# Patient Record
Sex: MALE | Race: WHITE | NOT HISPANIC OR LATINO | Employment: FULL TIME | URBAN - METROPOLITAN AREA
[De-identification: names, ages, dates, MRNs, and addresses within clinical notes are randomized per-mention and may not be internally consistent; named-entity substitution may affect disease eponyms.]

---

## 2020-08-15 ENCOUNTER — HOSPITAL ENCOUNTER (EMERGENCY)
Facility: HOSPITAL | Age: 25
Discharge: HOME | End: 2020-08-15
Attending: EMERGENCY MEDICINE

## 2020-08-15 VITALS
OXYGEN SATURATION: 98 % | DIASTOLIC BLOOD PRESSURE: 69 MMHG | BODY MASS INDEX: 34.24 KG/M2 | HEIGHT: 77 IN | HEART RATE: 82 BPM | RESPIRATION RATE: 16 BRPM | SYSTOLIC BLOOD PRESSURE: 104 MMHG | WEIGHT: 290 LBS | TEMPERATURE: 98.8 F

## 2020-08-15 DIAGNOSIS — L02.91 CUTANEOUS ABSCESS: Primary | ICD-10-CM

## 2020-08-15 PROCEDURE — 99284 EMERGENCY DEPT VISIT MOD MDM: CPT | Performed by: EMERGENCY MEDICINE

## 2020-08-15 PROCEDURE — 99282 EMERGENCY DEPT VISIT SF MDM: CPT

## 2020-08-15 RX ORDER — CLINDAMYCIN HYDROCHLORIDE 150 MG/1
300 CAPSULE ORAL ONCE
Status: COMPLETED | OUTPATIENT
Start: 2020-08-15 | End: 2020-08-15

## 2020-08-15 RX ORDER — IBUPROFEN 400 MG/1
400 TABLET ORAL ONCE
Status: COMPLETED | OUTPATIENT
Start: 2020-08-15 | End: 2020-08-15

## 2020-08-15 RX ORDER — ACETAMINOPHEN 325 MG/1
650 TABLET ORAL ONCE
Status: COMPLETED | OUTPATIENT
Start: 2020-08-15 | End: 2020-08-15

## 2020-08-15 RX ORDER — CLINDAMYCIN HYDROCHLORIDE 150 MG/1
300 CAPSULE ORAL EVERY 8 HOURS SCHEDULED
Qty: 20 CAPSULE | Refills: 0 | Status: SHIPPED | OUTPATIENT
Start: 2020-08-15 | End: 2020-08-22

## 2020-08-15 RX ORDER — BUPIVACAINE HYDROCHLORIDE 5 MG/ML
30 INJECTION, SOLUTION EPIDURAL; INTRACAUDAL ONCE
Status: COMPLETED | OUTPATIENT
Start: 2020-08-15 | End: 2020-08-15

## 2020-08-15 RX ADMIN — BUPIVACAINE HYDROCHLORIDE 30 ML: 5 INJECTION, SOLUTION EPIDURAL; INTRACAUDAL at 13:50

## 2020-08-15 RX ADMIN — IBUPROFEN 400 MG: 400 TABLET ORAL at 13:49

## 2020-08-15 RX ADMIN — CLINDAMYCIN HYDROCHLORIDE 300 MG: 150 CAPSULE ORAL at 13:48

## 2020-08-15 RX ADMIN — ACETAMINOPHEN 650 MG: 325 TABLET, FILM COATED ORAL at 13:49

## 2020-08-15 NOTE — DISCHARGE INSTRUCTIONS
Diagnosis; right lower leg cutaneous abscess    - activity as tolerated     - can leave dressing on tonight- tomorrow can take off and wash daily with soap and water     - expect blood drainage over the next 7-14 days- should decrease over time - re dress as needed    - for 30 minutes a day for the next week- apply warm soap rag to area to let drain    - wound will take up to 1 month to fully heal     - inspect wound daily please return to  the er for any fevers- temp > 100 4/ any spreading redness/warmth/ pain at site- any redness spreading up leg     - cleocin- antibiotic- starting tonight with dinner 1 capsule 3 times a day for 7 days     - for pain- would take over the counter generic tylenol 500 mg/ together with over the counter generic ibuprofen 400 mg- 4 times per day with meals / liquids

## 2020-08-19 NOTE — ED PROVIDER NOTES
History  Chief Complaint   Patient presents with    Spider Bite     Pt reports suspected spider bite to right lower leg on 12th  Reports increased redness, pain with standing  30 yr male with previous hx of abscesses- c/o  3 days of rle swelling/ redness/ skin lesion - no fevers/chills/systemic comps       History provided by:  Patient   used: No        None       History reviewed  No pertinent past medical history  Past Surgical History:   Procedure Laterality Date    CHOLECYSTECTOMY         History reviewed  No pertinent family history  I have reviewed and agree with the history as documented  E-Cigarette/Vaping    E-Cigarette Use Current Every Day User      E-Cigarette/Vaping Substances    Nicotine Yes      Social History     Tobacco Use    Smoking status: Current Every Day Smoker    Smokeless tobacco: Never Used   Substance Use Topics    Alcohol use: Not Currently    Drug use: Not Currently       Review of Systems   Constitutional: Negative  HENT: Negative  Eyes: Negative  Respiratory: Negative  Cardiovascular: Negative  Gastrointestinal: Negative  Endocrine: Negative  Genitourinary: Negative  Musculoskeletal: Negative  Skin: Negative for color change, pallor, rash and wound  rle abscess   Allergic/Immunologic: Negative  Neurological: Negative  Hematological: Negative  Psychiatric/Behavioral: Negative  Physical Exam  Physical Exam  Vitals signs and nursing note reviewed  Constitutional:       General: He is not in acute distress  Appearance: Normal appearance  He is not ill-appearing, toxic-appearing or diaphoretic  Comments: avss-- pulse ox 98 % on ra- interpretation is normal- no intervention- well appearing- in nad    Musculoskeletal: Normal range of motion  General: Swelling and tenderness present  No deformity or signs of injury  Right lower leg: No edema        Comments: rle- lower lateral calf With open skin area with purulent d/ c- with surrounding erythema/ edema/ wrmth-- no crepitus/necrosis-    Skin:     General: Skin is warm  Coloration: Skin is not jaundiced or pale  Findings: Erythema and lesion present  No bruising or rash  Neurological:      Mental Status: He is alert  Vital Signs  ED Triage Vitals   Temperature Pulse Respirations Blood Pressure SpO2   08/15/20 1258 08/15/20 1258 08/15/20 1258 08/15/20 1258 08/15/20 1258   98 8 °F (37 1 °C) (!) 113 14 114/76 99 %      Temp Source Heart Rate Source Patient Position - Orthostatic VS BP Location FiO2 (%)   08/15/20 1258 08/15/20 1458 08/15/20 1458 08/15/20 1458 --   Oral Monitor Lying Left arm       Pain Score       08/15/20 1258       4           Vitals:    08/15/20 1258 08/15/20 1458   BP: 114/76 104/69   Pulse: (!) 113 82   Patient Position - Orthostatic VS:  Lying         Visual Acuity      ED Medications  Medications   clindamycin (CLEOCIN) capsule 300 mg (300 mg Oral Given 8/15/20 1348)   bupivacaine (PF) (MARCAINE) 0 5 % injection 30 mL (30 mL Infiltration Given by Other 8/15/20 1350)   acetaminophen (TYLENOL) tablet 650 mg (650 mg Oral Given 8/15/20 1349)   ibuprofen (MOTRIN) tablet 400 mg (400 mg Oral Given 8/15/20 1349)       Diagnostic Studies  Results Reviewed     None                 No orders to display              Procedures  Procedures         ED Course                                             MDM      Disposition  Final diagnoses:   Cutaneous abscess     Time reflects when diagnosis was documented in both MDM as applicable and the Disposition within this note     Time User Action Codes Description Comment    8/15/2020  3:29 PM Drew Carrel Add [L02 91] Cutaneous abscess       ED Disposition     ED Disposition Condition Date/Time Comment    Discharge Stable Sat Aug 15, 2020  3:28 PM [place] [facility] discharge to home/self care              Follow-up Information    None         Discharge Medication List as of 8/15/2020  3:34 PM      START taking these medications    Details   clindamycin (CLEOCIN) 150 mg capsule Take 2 capsules (300 mg total) by mouth every 8 (eight) hours for 20 doses, Starting Sat 8/15/2020, Until Sat 8/22/2020, Print           No discharge procedures on file      PDMP Review     None          ED Provider  Electronically Signed by           Lauren Fried MD  08/19/20 4451

## 2020-08-19 NOTE — ED ATTENDING ATTESTATION
8/15/2020  Shahzad HASKINS MD, saw and evaluated the patient  I have discussed the patient with the resident/non-physician practitioner and agree with the resident's/non-physician practitioner's findings, Plan of Care, and MDM as documented in the resident's/non-physician practitioner's note, except where noted  All available labs and Radiology studies were reviewed  I was present for key portions of any procedure(s) performed by the resident/non-physician practitioner and I was immediately available to provide assistance  At this point I agree with the current assessment done in the Emergency Department    I have conducted an independent evaluation of this patient a history and physical is as follows:    ED Course         Critical Care Time  Procedures

## 2020-08-19 NOTE — ED PROCEDURE NOTE
PROCEDURE  Incision and drain    Date/Time: 8/15/2020 4:00 PM  Performed by: Luly Urban MD  Authorized by: Luly Urban MD     Unsuccessful Attempt: unsuccessful attempt    Patient location:  ED  Other Assisting Provider: Yes (comment)    Consent:     Consent obtained:  Verbal    Consent given by:  Patient    Risks discussed:  Bleeding, incomplete drainage and pain    Alternatives discussed:  No treatment  Universal protocol:     Procedure explained and questions answered to patient or proxy's satisfaction: yes      Patient identity confirmed:  Verbally with patient  Location:     Type:  Abscess    Size:  1    Location:  Lower extremity    Lower extremity location: right lower leg   Pre-procedure details:     Skin preparation:  Chloraprep  Anesthesia (see MAR for exact dosages): Anesthesia method:  Local infiltration    Local anesthetic:  Bupivacaine 0 5% w/o epi (2 ml)  Procedure details:     Complexity:  Simple    Needle aspiration: no      Incision types:  Single straight    Scalpel blade:  15    Approach:  Open    Incision depth:  Subcutaneous    Wound management:  Probed and deloculated (sterile wter irrigation )    Hemostat:  Yes    Drainage:  Bloody and purulent    Drainage amount:  Scant    Wound treatment:  Wound left open  Post-procedure details:     Patient tolerance of procedure:   Tolerated well, no immediate complications  Comments:       4 x 4  Cling and coban dressing          Luly Urban MD  08/19/20 507 CHIQUITA Willoughby MD  08/26/20 1944       Luly Urban MD  08/26/20 1100